# Patient Record
Sex: FEMALE | Race: WHITE
[De-identification: names, ages, dates, MRNs, and addresses within clinical notes are randomized per-mention and may not be internally consistent; named-entity substitution may affect disease eponyms.]

---

## 2019-06-24 ENCOUNTER — HOSPITAL ENCOUNTER (EMERGENCY)
Dept: HOSPITAL 10 - FTE | Age: 19
Discharge: HOME | End: 2019-06-24
Payer: MEDICAID

## 2019-06-24 ENCOUNTER — HOSPITAL ENCOUNTER (EMERGENCY)
Dept: HOSPITAL 91 - FTE | Age: 19
Discharge: HOME | End: 2019-06-24
Payer: MEDICAID

## 2019-06-24 VITALS
HEIGHT: 66 IN | WEIGHT: 183.2 LBS | WEIGHT: 183.2 LBS | BODY MASS INDEX: 29.44 KG/M2 | HEIGHT: 66 IN | BODY MASS INDEX: 29.44 KG/M2

## 2019-06-24 VITALS — SYSTOLIC BLOOD PRESSURE: 107 MMHG | RESPIRATION RATE: 16 BRPM | HEART RATE: 91 BPM | DIASTOLIC BLOOD PRESSURE: 71 MMHG

## 2019-06-24 DIAGNOSIS — J02.9: Primary | ICD-10-CM

## 2019-06-24 LAB
ABNORMAL IP MESSAGE: 1
ADD MAN DIFF?: NO
ALANINE AMINOTRANSFERASE: 16 IU/L (ref 13–69)
ALBUMIN/GLOBULIN RATIO: 1.45
ALBUMIN: 4.5 G/DL (ref 3.3–4.9)
ALKALINE PHOSPHATASE: 71 IU/L (ref 42–121)
ANION GAP: 14 (ref 5–13)
ASPARTATE AMINO TRANSFERASE: 13 IU/L (ref 15–46)
BASOPHIL #: 0.1 10^3/UL (ref 0–0.1)
BASOPHILS %: 0.4 % (ref 0–2)
BILIRUBIN,DIRECT: 0 MG/DL (ref 0–0.2)
BILIRUBIN,TOTAL: 1.1 MG/DL (ref 0.2–1.3)
BLOOD UREA NITROGEN: 11 MG/DL (ref 7–20)
CALCIUM: 9.4 MG/DL (ref 8.4–10.2)
CARBON DIOXIDE: 28 MMOL/L (ref 21–31)
CHLORIDE: 102 MMOL/L (ref 97–110)
CREATININE: 0.55 MG/DL (ref 0.44–1)
EOSINOPHILS #: 0 10^3/UL (ref 0–0.5)
EOSINOPHILS %: 0.1 % (ref 0–7)
GLOBULIN: 3.1 G/DL (ref 1.3–3.2)
GLUCOSE: 103 MG/DL (ref 70–220)
HEMATOCRIT: 38.2 % (ref 37–47)
HEMOGLOBIN: 11.9 G/DL (ref 12–16)
IMMATURE GRANS #M: 0.09 10^3/UL (ref 0–0.03)
IMMATURE GRANS % (M): 0.4 % (ref 0–0.43)
LYMPHOCYTES #: 2.5 10^3/UL (ref 0.8–2.9)
LYMPHOCYTES %: 11.9 % (ref 15–51)
MEAN CORPUSCULAR HEMOGLOBIN: 24.9 PG (ref 29–33)
MEAN CORPUSCULAR HGB CONC: 31.2 G/DL (ref 32–37)
MEAN CORPUSCULAR VOLUME: 79.9 FL (ref 82–101)
MEAN PLATELET VOLUME: 11.3 FL (ref 7.4–10.4)
MONOCYTE #: 1.6 10^3/UL (ref 0.3–0.9)
MONOCYTES %: 7.9 % (ref 0–11)
NEUTROPHIL #: 16.4 10^3/UL (ref 1.6–7.5)
NEUTROPHILS %: 79.3 % (ref 39–77)
NUCLEATED RED BLOOD CELLS #: 0 10^3/UL (ref 0–0)
NUCLEATED RED BLOOD CELLS%: 0 /100WBC (ref 0–0)
PLATELET COUNT: 250 10^3/UL (ref 140–415)
POTASSIUM: 3.7 MMOL/L (ref 3.5–5.1)
RED BLOOD COUNT: 4.78 10^6/UL (ref 4.2–5.4)
RED CELL DISTRIBUTION WIDTH: 15.6 % (ref 11.5–14.5)
SODIUM: 144 MMOL/L (ref 135–144)
TOTAL PROTEIN: 7.6 G/DL (ref 6.1–8.1)
WHITE BLOOD COUNT: 20.7 10^3/UL (ref 4.8–10.8)

## 2019-06-24 PROCEDURE — 99283 EMERGENCY DEPT VISIT LOW MDM: CPT

## 2019-06-24 PROCEDURE — 80053 COMPREHEN METABOLIC PANEL: CPT

## 2019-06-24 PROCEDURE — 36415 COLL VENOUS BLD VENIPUNCTURE: CPT

## 2019-06-24 PROCEDURE — 81025 URINE PREGNANCY TEST: CPT

## 2019-06-24 PROCEDURE — 85025 COMPLETE CBC W/AUTO DIFF WBC: CPT

## 2019-06-24 NOTE — ERD
ER Documentation


Chief Complaint


Chief Complaint





sore throat x 3 days and bodyaches





HPI


Patient is a 19-year-old female who presents with 3 days of fever and sore 


throat.  She is been taking Tylenol and Motrin and last time was around 7 AM 


this morning.  She has had no cough.  No nausea or vomiting.  Secondarily she is


also complaining that she is been getting a lot of bruises recently does not 


recall any trauma.  She denies any bruising at this time.





ROS


All systems reviewed and are negative except as per history of present illness.





Medications


Home Meds


Active Scripts


Amoxicillin* (Amoxicillin*) 500 Mg Cap, 500 MG PO BID for 7 Days, CAP


   Prov:CLARENCE JACKSON ÁNGEL         6/24/19





FmHx


Family History:  No diabetes





Physical Exam


Vitals





Vital Signs


  Date      Temp  Pulse  Resp  B/P (MAP)   Pulse Ox  O2          O2 Flow    FiO2


Time                                                 Delivery    Rate


   6/24/19  99.2     99    18      133/80        98


     16:39                           (97)





Physical Exam


INITIAL VITAL SIGNS: Reviewed by me


GENERAL: Awake, alert and oriented x 4, well appearing, nontoxic, speaking in 


full sentences. No acute distress


HEAD: Atraumatic


NECK: Supple. No masses. Full range of motion.  No meningismus. No midline 


tenderness. 


EYES: EOMI. PERRL.


 


THROAT: Bilateral tonsillar erythema and edema with scant exudate, uvula 


midline, no kissing tonsils


RESPIRATORY: Clear to auscultation bilaterally. Symmetric chest wall rise.  No 


wheezing or rales. No accessory muscle use.  


CV: Regular rate and rhythm. No murmurs, rubs, or gallops.  


ABDOMEN: Soft, non-distended. Nontender. Negative [place]. Negative [place] 


point tenderness. No CVA tenderness bilaterally. No guarding. No rebound. 


: Deffered.


EXTREMITIES: No clubbing or cyanosis. No edema. Moving all extremities normally.


BACK: No midline tenderness to palpation.  No step-offs. 


SKIN: Warm and dry.  No rash or petechiae.


Result Diagram:  


6/24/19 1742                                                                    


           6/24/19 1742





Results 24 hrs





Laboratory Tests


       Test
                                 6/24/19
17:42  6/24/19
17:47


       White Blood Count                     20.7 10^3/ul


       Red Blood Count                       4.78 10^6/ul


       Hemoglobin                               11.9 g/dl


       Hematocrit                                  38.2 %


       Mean Corpuscular Volume                    79.9 fl


       Mean Corpuscular Hemoglobin                24.9 pg


       Mean Corpuscular Hemoglobin
Concent     31.2 g/dl 
  



       Red Cell Distribution Width                 15.6 %


       Platelet Count                         250 10^3/UL


       Mean Platelet Volume                       11.3 fl


       Immature Granulocytes %                    0.400 %


       Neutrophils %                               79.3 %


       Lymphocytes %                               11.9 %


       Monocytes %                                  7.9 %


       Eosinophils %                                0.1 %


       Basophils %                                  0.4 %


       Nucleated Red Blood Cells %            0.0 /100WBC


       Immature Granulocytes #              0.090 10^3/ul


       Neutrophils #                         16.4 10^3/ul


       Lymphocytes #                          2.5 10^3/ul


       Monocytes #                            1.6 10^3/ul


       Eosinophils #                          0.0 10^3/ul


       Basophils #                            0.1 10^3/ul


       Nucleated Red Blood Cells #            0.0 10^3/ul


       Sodium Level                            144 mmol/L


       Potassium Level                         3.7 mmol/L


       Chloride Level                          102 mmol/L


       Carbon Dioxide Level                     28 mmol/L


       Anion Gap                                       14


       Blood Urea Nitrogen                       11 mg/dl


       Creatinine                              0.55 mg/dl


       Est Glomerular Filtrat Rate
mL/min   > 60 mL/min 
   



       Glucose Level                            103 mg/dl


       Calcium Level                            9.4 mg/dl


       Total Bilirubin                          1.1 mg/dl


       Direct Bilirubin                        0.00 mg/dl


       Indirect Bilirubin                       1.1 mg/dl


       Aspartate Amino Transf
(AST/SGOT)         13 IU/L 
  



       Alanine Aminotransferase
(ALT/SGPT)       16 IU/L 
  



       Alkaline Phosphatase                       71 IU/L


       Total Protein                             7.6 g/dl


       Albumin                                   4.5 g/dl


       Globulin                                 3.10 g/dl


       Albumin/Globulin Ratio                        1.45


       POC Beta HCG, Qualitative                            NEGATIVE








Procedures/MDM


Patient has sore throat and she does have red swollen tonsils with exudate.  


Given her complaint of recent bruising I did order CBC and chemistry panel.  CBC


showed elevated white blood cell count which is expected as she does have what i


s likely strep.  Platelet count is normal.  Discharged with amoxicillin.  


Patient counseled regarding my diagnostic impression and care plan. Prior to 


discharge all questions answered. Pt agrees with treatment plan and understands 


strict return precautions. Pt is instructed to follow up with primary care 


provider within 24-48 hours. Precautionary instructions provided including 


instructions to return to the ER if not improving or for any worsening or 


changing symptoms or concerns.





Departure


Diagnosis:  


   Primary Impression:  


   Pharyngitis


Condition:  Stable











CLARENCE JACKSON PA-C            Jun 24, 2019 17:11